# Patient Record
Sex: MALE | URBAN - METROPOLITAN AREA
[De-identification: names, ages, dates, MRNs, and addresses within clinical notes are randomized per-mention and may not be internally consistent; named-entity substitution may affect disease eponyms.]

---

## 2019-01-28 ENCOUNTER — APPOINTMENT (RX ONLY)
Dept: URBAN - METROPOLITAN AREA CLINIC 112 | Facility: CLINIC | Age: 49
Setting detail: DERMATOLOGY
End: 2019-01-28

## 2019-01-28 DIAGNOSIS — L40.0 PSORIASIS VULGARIS: ICD-10-CM

## 2019-01-28 PROBLEM — L85.3 XEROSIS CUTIS: Status: ACTIVE | Noted: 2019-01-28

## 2019-01-28 PROBLEM — L29.8 OTHER PRURITUS: Status: ACTIVE | Noted: 2019-01-28

## 2019-01-28 PROCEDURE — 96900 ACTINOTHERAPY UV LIGHT: CPT

## 2019-01-28 PROCEDURE — ? PHOTOTHERAPY TREATMENT

## 2019-01-28 PROCEDURE — ? SEPARATE AND IDENTIFIABLE DOCUMENTATION

## 2019-01-28 PROCEDURE — ? COUNSELING

## 2019-01-28 PROCEDURE — ? TREATMENT REGIMEN

## 2019-01-28 PROCEDURE — ? PRESCRIPTION

## 2019-01-28 PROCEDURE — 99203 OFFICE O/P NEW LOW 30 MIN: CPT | Mod: 25

## 2019-01-28 RX ORDER — FLUOCINONIDE 0.5 MG/ML
SOLUTION TOPICAL
Qty: 1 | Refills: 2 | Status: ERX | COMMUNITY
Start: 2019-01-28

## 2019-01-28 RX ORDER — FLUOCINONIDE 0.5 MG/G
CREAM TOPICAL
Qty: 1 | Refills: 1 | Status: ERX | COMMUNITY
Start: 2019-01-28

## 2019-01-28 RX ORDER — HYDROCORTISONE 25 MG/G
CREAM TOPICAL
Qty: 1 | Refills: 2 | Status: ERX | COMMUNITY
Start: 2019-01-28

## 2019-01-28 RX ORDER — KETOCONAZOLE 20.5 MG/ML
SHAMPOO, SUSPENSION TOPICAL
Qty: 1 | Refills: 5 | Status: ERX | COMMUNITY
Start: 2019-01-28

## 2019-01-28 RX ORDER — KETOCONAZOLE 20 MG/G
CREAM TOPICAL
Qty: 1 | Refills: 2 | Status: ERX | COMMUNITY
Start: 2019-01-28

## 2019-01-28 RX ADMIN — FLUOCINONIDE: 0.5 CREAM TOPICAL at 18:27

## 2019-01-28 RX ADMIN — KETOCONAZOLE: 20.5 SHAMPOO, SUSPENSION TOPICAL at 18:22

## 2019-01-28 RX ADMIN — HYDROCORTISONE: 25 CREAM TOPICAL at 18:29

## 2019-01-28 RX ADMIN — KETOCONAZOLE: 20 CREAM TOPICAL at 18:30

## 2019-01-28 RX ADMIN — FLUOCINONIDE: 0.5 SOLUTION TOPICAL at 18:26

## 2019-01-28 ASSESSMENT — PGA PSORIASIS: PGA PSORIASIS: MODERATE (MODERATE PLAQUE ELEVATION, MODERATE ERYTHEMA, COARSE SCALE PREDOMINATES)

## 2019-01-28 ASSESSMENT — BSA PSORIASIS: % BODY COVERED IN PSORIASIS: 4

## 2019-01-28 NOTE — PROCEDURE: PHOTOTHERAPY TREATMENT
Comments: Increase 20% until 2000mj/cm2, then 10% increases as tolerated. Per lucy Siddiqui for patient to not shield groin during phototherapy sessions. Recommended full body phototherapy once weekly in combination with twice weekly excimer treatments on scalp.
Protocol For Photochemotherapy: Mineral Oil And Nbuvb: The patient received Photochemotherapy: Mineral Oil and NBUVB (mineral oil applied to all lesions prior to phototherapy).
Protocol For Uva: The patient received UVA.
Render Post-Care In The Note: no
Protocol For Nb Uva: The patient received NB UVA.
Total Body Time: 0:50 minutes
Protocol For Photochemotherapy: Petrolatum And Broad Band Uvb: The patient received Photochemotherapy: Petrolatum and Broad Band UVB.
Protocol For Photochemotherapy For Severe Photoresponsive Dermatoses: Puva: The patient received Photochemotherapy for severe photoresponsive dermatoses: PUVA requiring at least 4 to 8 hours of care under direct physician supervision.
Protocol For Photochemotherapy: Triamcinolone Ointment And Nbuvb: The patient received Photochemotherapy: Triamcinolone and NBUVB (triamcinolone ointment applied to all lesions prior to phototherapy).
Post-Care Instructions: I reviewed with the patient in detail post-care instructions. Patient is to wear sun protection. Patients may expect sunburn like redness, discomfort and scabbing.
Protocol For Uva1: The patient received UVA1.
Protocol For Photochemotherapy For Severe Photoresponsive Dermatoses: Tar And Broad Band Uvb (Goeckerman Treatment): The patient received Photochemotherapy for severe photoresponsive dermatoses: Tar and Broad Band UVB (Goeckerman treatment) requiring at least 4 to 8 hours of care under direct physician supervision.
Protocol: NBUVB
Protocol For Photochemotherapy: Mineral Oil And Broad Band Uvb: The patient received Photochemotherapy: Mineral Oil and Broad Band UVB.
Protocol For Bath Puva: The patient received Bath PUVA.
Detail Level: Zone
Protocol For Nbuvb: The patient received NBUVB.
Protocol For Broad Band Uvb: The patient received Broad Band UVB.
Protocol For Photochemotherapy For Severe Photoresponsive Dermatoses: Petrolatum And Broad Band Uvb: The patient received Photochemotherapyfor severe photoresponsive dermatoses: Petrolatum and Broad Band UVB requiring at least 4 to 8 hours of care under direct physician supervision.
Treatment Number: 1
Protocol For Photochemotherapy: Tar And Nbuvb (Goeckerman Treatment): The patient received Photochemotherapy: Tar and NBUVB (Goeckerman treatment).
Protocol For Protocol For Photochemotherapy For Severe Photoresponsive Dermatoses: Bath Puva: The patient received Photochemotherapy for severe photoresponsive dermatoses: Bath PUVA requiring at least 4 to 8 hours of care under direct physician supervision.
Protocol For Photochemotherapy For Severe Photoresponsive Dermatoses: Tar And Nbuvb (Goeckerman Treatment): The patient received Photochemotherapy for severe photoresponsive dermatoses: Tar and NBUVB (Goeckerman treatment) requiring at least 4 to 8 hours of care under direct physician supervision.
Skin Type: II
Protocol For Photochemotherapy: Baby Oil And Nbuvb: The patient received Photochemotherapy: Baby Oil and NBUVB (baby oil applied to all lesions prior to phototherapy).
Protocol For Photochemotherapy: Petrolatum And Nbuvb: The patient received Photochemotherapy: Petrolatum and NBUVB (petrolatum applied to all lesions prior to phototherapy).
Protocol For Photochemotherapy For Severe Photoresponsive Dermatoses: Petrolatum And Nbuvb: The patient received Photochemotherapy for severe photoresponsive dermatoses: Petrolatum and NBUVB requiring at least 4 to 8 hours of care under direct physician supervision.
Consent: Written consent obtained.  The risks were reviewed with the patient including but not limited to: burn, pigmentary changes, pain, blistering, scabbing, redness, increased risk of skin cancers, and the remote possibility of scarring.
Protocol For Photochemotherapy: Tar And Broad Band Uvb (Goeckerman Treatment): The patient received Photochemotherapy: Tar and Broad Band UVB (Goeckerman treatment).
Total Body Energy: 120 mj/cm2
Protocol For Puva: The patient received PUVA.

## 2019-01-28 NOTE — HPI: RASH (PSORIASIS)
Do You Have A Family History Of Psoriasis?: no
How Severe Is Your Psoriasis?: moderate
Is This A New Presentation, Or A Follow-Up?: Psoriasis
Additional History: Patient was diagnosed with psoriatic arthritic in 2008. Previously patient had nail putting and swollen joints in hands. Patient was on Enbrel fro [id number] and saw significant improvement. Patient stopped Enbrel due to concerns with immunosuppression. Patient started diet with cutting out processed food and dairy. Patient unsure of improvement with diet change. Other prior treatments include unknown topical steroids. Current treatment: OTC antifungal cream BIW.

## 2019-01-28 NOTE — PROCEDURE: TREATMENT REGIMEN
Action 1: Continue
Other Instructions: Patient to contact clinic of joint pain worsens. Will consider Otezla at next office visit depending on improvement with current treatment regimen.
Start Regimen: -Ketoconazole 2% shampoo on scalp, face, groin daily until improved, then 2-3 times weekly for maintenance. Patient to lather and leave 5 minutes prior to rinsing. \\n\\n-Fluocinonide 0.05 solution on scalp twice daily, 2 weeks on,1 week off, repeat prn flares, avoid on face/groin/ axillae \\n\\n-Fluocinonide 0.05% cream on legs and trunk twice daily, 2 weeks on,1 week off, repeat prn flares, avoid on face/groin/axillae \\n\\n-Patient to begin applying 1:1 mixture of ketoconazole 2% cream and hydrocortisone 2.5% cream on face and groin twice daily, repeat prn flares. \\n\\n-Patient to begin NBUVB phototherapy (full body) once weekly. Skin type II. Phototherapy PowerPoint and consent completed today.\\n\\n-Patient to begin excimer treatment on scalp BIW.
Detail Level: Zone

## 2019-01-30 ENCOUNTER — APPOINTMENT (RX ONLY)
Dept: URBAN - METROPOLITAN AREA CLINIC 112 | Facility: CLINIC | Age: 49
Setting detail: DERMATOLOGY
End: 2019-01-30

## 2019-01-30 DIAGNOSIS — L40.0 PSORIASIS VULGARIS: ICD-10-CM

## 2019-01-30 PROCEDURE — ? EXCIMER LASER

## 2019-01-30 PROCEDURE — 96921 EXCIMER LSR PSRIASIS 250-500: CPT

## 2019-01-30 ASSESSMENT — LOCATION ZONE DERM: LOCATION ZONE: SCALP

## 2019-01-30 ASSESSMENT — LOCATION SIMPLE DESCRIPTION DERM: LOCATION SIMPLE: SCALP

## 2019-01-30 ASSESSMENT — LOCATION DETAILED DESCRIPTION DERM: LOCATION DETAILED: LEFT SUPERIOR PARIETAL SCALP

## 2019-01-30 NOTE — PROCEDURE: EXCIMER LASER
Comments: Excimer laser initiation slide show reviewed.
Post-Care Instructions: I reviewed with the patient in detail post-care instructions. Patient should stay away from the sun and wear sun protection until treated areas are fully healed.
Total Square Area In Cm2 (Required For Proper Billing- Whole Numbers Only Please): 272
Mode: repeat paint
Fluence #1 (J/Cm2 Or Mj/Cm2): 500
Spot Size: 3 x 3 cm
Consent: Written consent obtained, risks reviewed including but not limited to crusting, scabbing, blistering, scarring, darker or lighter pigmentary change, incidental hair removal, bruising, and/or incomplete removal.
Detail Level: Zone
Fluence Units: mJ/cm2
Treatment Number: 1
Location #1: entire scalp

## 2019-02-01 ENCOUNTER — APPOINTMENT (RX ONLY)
Dept: URBAN - METROPOLITAN AREA CLINIC 112 | Facility: CLINIC | Age: 49
Setting detail: DERMATOLOGY
End: 2019-02-01

## 2019-02-01 DIAGNOSIS — L40.0 PSORIASIS VULGARIS: ICD-10-CM

## 2019-02-01 PROCEDURE — ? EXCIMER LASER

## 2019-02-01 PROCEDURE — 96920 EXCIMER LSR PSRIASIS<250SQCM: CPT

## 2019-02-01 ASSESSMENT — LOCATION SIMPLE DESCRIPTION DERM: LOCATION SIMPLE: SCALP

## 2019-02-01 ASSESSMENT — LOCATION DETAILED DESCRIPTION DERM: LOCATION DETAILED: LEFT SUPERIOR PARIETAL SCALP

## 2019-02-01 ASSESSMENT — LOCATION ZONE DERM: LOCATION ZONE: SCALP

## 2019-02-01 NOTE — PROCEDURE: EXCIMER LASER
Consent: Written consent obtained, risks reviewed including but not limited to crusting, scabbing, blistering, scarring, darker or lighter pigmentary change, incidental hair removal, bruising, and/or incomplete removal.
Location #1: entire scalp
Fluence Units: mJ/cm2
Post-Care Instructions: I reviewed with the patient in detail post-care instructions. Patient should stay away from the sun and wear sun protection until treated areas are fully healed.
Total Square Area In Cm2 (Required For Proper Billing- Whole Numbers Only Please): 240
Mode: repeat paint
Spot Size: 3 x 3 cm
Fluence #1 (J/Cm2 Or Mj/Cm2): 630
Detail Level: Zone
Treatment Number: 2

## 2019-02-01 NOTE — HPI: EXCIMER LASER PHOTOTHERAPY
Is This A New Presentation, Or A Follow-Up?: Excimer Laser Follow-up
When Was Your Last Excimer Laser Treatment?: 1/30

## 2019-02-04 ENCOUNTER — APPOINTMENT (RX ONLY)
Dept: URBAN - METROPOLITAN AREA CLINIC 112 | Facility: CLINIC | Age: 49
Setting detail: DERMATOLOGY
End: 2019-02-04

## 2019-02-04 DIAGNOSIS — L40.0 PSORIASIS VULGARIS: ICD-10-CM

## 2019-02-04 PROCEDURE — 96920 EXCIMER LSR PSRIASIS<250SQCM: CPT

## 2019-02-04 PROCEDURE — ? EXCIMER LASER

## 2019-02-04 ASSESSMENT — LOCATION DETAILED DESCRIPTION DERM: LOCATION DETAILED: LEFT SUPERIOR PARIETAL SCALP

## 2019-02-04 ASSESSMENT — LOCATION SIMPLE DESCRIPTION DERM: LOCATION SIMPLE: SCALP

## 2019-02-04 ASSESSMENT — LOCATION ZONE DERM: LOCATION ZONE: SCALP

## 2019-02-04 NOTE — PROCEDURE: EXCIMER LASER
Spot Size: 3 x 3 cm
Detail Level: Zone
Post-Care Instructions: I reviewed with the patient in detail post-care instructions. Patient should stay away from the sun and wear sun protection until treated areas are fully healed.
Total Square Area In Cm2 (Required For Proper Billing- Whole Numbers Only Please): 161
Fluence Units: mJ/cm2
Consent: Written consent obtained, risks reviewed including but not limited to crusting, scabbing, blistering, scarring, darker or lighter pigmentary change, incidental hair removal, bruising, and/or incomplete removal.
Location #1: entire scalp
Mode: repeat paint
Treatment Number: 3
Fluence #1 (J/Cm2 Or Mj/Cm2): 795

## 2019-02-04 NOTE — HPI: EXCIMER LASER PHOTOTHERAPY
Is This A New Presentation, Or A Follow-Up?: Excimer Laser Treatment
When Was Your Last Excimer Laser Treatment?: 2/1/2019

## 2019-02-08 ENCOUNTER — APPOINTMENT (RX ONLY)
Dept: URBAN - METROPOLITAN AREA CLINIC 112 | Facility: CLINIC | Age: 49
Setting detail: DERMATOLOGY
End: 2019-02-08

## 2019-02-08 DIAGNOSIS — L40.0 PSORIASIS VULGARIS: ICD-10-CM

## 2019-02-08 PROCEDURE — ? EXCIMER LASER

## 2019-02-08 PROCEDURE — 96920 EXCIMER LSR PSRIASIS<250SQCM: CPT

## 2019-02-08 ASSESSMENT — LOCATION SIMPLE DESCRIPTION DERM: LOCATION SIMPLE: SCALP

## 2019-02-08 ASSESSMENT — LOCATION ZONE DERM: LOCATION ZONE: SCALP

## 2019-02-08 ASSESSMENT — LOCATION DETAILED DESCRIPTION DERM: LOCATION DETAILED: LEFT SUPERIOR PARIETAL SCALP

## 2019-02-08 NOTE — PROCEDURE: EXCIMER LASER
Spot Size: 3 x 3 cm
Treatment Number: 4
Detail Level: Zone
Comments: Significant improvement appreciated. Only 2-3 few very small plaques noted with diffuse background erythema and mild scale.  Patient agrees and is pleased with results.
Total Square Area In Cm2 (Required For Proper Billing- Whole Numbers Only Please): 178
Post-Care Instructions: I reviewed with the patient in detail post-care instructions. Patient should stay away from the sun and wear sun protection until treated areas are fully healed.
Mode: repeat paint
Location #1: entire scalp
Fluence #1 (J/Cm2 Or Mj/Cm2): 990
Fluence Units: mJ/cm2
Consent: Written consent obtained, risks reviewed including but not limited to crusting, scabbing, blistering, scarring, darker or lighter pigmentary change, incidental hair removal, bruising, and/or incomplete removal.

## 2019-02-11 ENCOUNTER — APPOINTMENT (RX ONLY)
Dept: URBAN - METROPOLITAN AREA CLINIC 112 | Facility: CLINIC | Age: 49
Setting detail: DERMATOLOGY
End: 2019-02-11

## 2019-02-11 DIAGNOSIS — L40.0 PSORIASIS VULGARIS: ICD-10-CM

## 2019-02-11 PROCEDURE — 96920 EXCIMER LSR PSRIASIS<250SQCM: CPT

## 2019-02-11 PROCEDURE — ? EXCIMER LASER

## 2019-02-11 ASSESSMENT — LOCATION DETAILED DESCRIPTION DERM: LOCATION DETAILED: LEFT SUPERIOR PARIETAL SCALP

## 2019-02-11 ASSESSMENT — LOCATION SIMPLE DESCRIPTION DERM: LOCATION SIMPLE: SCALP

## 2019-02-11 ASSESSMENT — LOCATION ZONE DERM: LOCATION ZONE: SCALP

## 2019-02-11 NOTE — PROCEDURE: EXCIMER LASER
Treatment Number: 5
Consent: Written consent obtained, risks reviewed including but not limited to crusting, scabbing, blistering, scarring, darker or lighter pigmentary change, incidental hair removal, bruising, and/or incomplete removal.
Fluence Units: mJ/cm2
Location #1: entire scalp
Mode: repeat paint
Total Square Area In Cm2 (Required For Proper Billing- Whole Numbers Only Please): 188
Post-Care Instructions: I reviewed with the patient in detail post-care instructions. Patient should stay away from the sun and wear sun protection until treated areas are fully healed.
Comments: Significant improvement appreciated. Only 2-3 few very small plaques noted with diffuse background erythema and mild scale.  Patient agrees and is pleased with results.
Fluence #1 (J/Cm2 Or Mj/Cm2): 9318
Spot Size: 3 x 3 cm
Detail Level: Zone

## 2019-02-19 ENCOUNTER — APPOINTMENT (RX ONLY)
Dept: URBAN - METROPOLITAN AREA CLINIC 112 | Facility: CLINIC | Age: 49
Setting detail: DERMATOLOGY
End: 2019-02-19

## 2019-02-19 DIAGNOSIS — L40.0 PSORIASIS VULGARIS: ICD-10-CM

## 2019-02-19 PROCEDURE — ? EXCIMER LASER

## 2019-02-19 PROCEDURE — 96920 EXCIMER LSR PSRIASIS<250SQCM: CPT

## 2019-02-19 ASSESSMENT — LOCATION DETAILED DESCRIPTION DERM: LOCATION DETAILED: LEFT SUPERIOR PARIETAL SCALP

## 2019-02-19 ASSESSMENT — LOCATION ZONE DERM: LOCATION ZONE: SCALP

## 2019-02-19 ASSESSMENT — LOCATION SIMPLE DESCRIPTION DERM: LOCATION SIMPLE: SCALP

## 2019-02-19 NOTE — PROCEDURE: EXCIMER LASER
Detail Level: Zone
Mode: repeat paint
Consent: Written consent obtained, risks reviewed including but not limited to crusting, scabbing, blistering, scarring, darker or lighter pigmentary change, incidental hair removal, bruising, and/or incomplete removal.
Fluence #1 (J/Cm2 Or Mj/Cm2): 3769
Fluence Units: mJ/cm2
Location #1: entire scalp
Total Square Area In Cm2 (Required For Proper Billing- Whole Numbers Only Please): 201
Spot Size: 3 x 3 cm
Comments: No symptoms s/p last treatment. Increased (though still mild) erythema with minimal scale noted on the scalp throughout. Patient reports he  frequency of topical steroid use to 2-3x weekly as the sig. on his prescription refill had changed. Advised patient to continue topical steroids as directed in last office visit: twice daily applications.
Treatment Number: 5
Post-Care Instructions: I reviewed with the patient in detail post-care instructions. Patient should stay away from the sun and wear sun protection until treated areas are fully healed.

## 2019-03-01 ENCOUNTER — APPOINTMENT (RX ONLY)
Dept: URBAN - METROPOLITAN AREA CLINIC 112 | Facility: CLINIC | Age: 49
Setting detail: DERMATOLOGY
End: 2019-03-01

## 2019-03-01 DIAGNOSIS — L40.0 PSORIASIS VULGARIS: ICD-10-CM

## 2019-03-01 PROCEDURE — ? EXCIMER LASER

## 2019-03-01 PROCEDURE — 96920 EXCIMER LSR PSRIASIS<250SQCM: CPT

## 2019-03-01 ASSESSMENT — LOCATION ZONE DERM: LOCATION ZONE: SCALP

## 2019-03-01 ASSESSMENT — LOCATION DETAILED DESCRIPTION DERM: LOCATION DETAILED: LEFT SUPERIOR PARIETAL SCALP

## 2019-03-01 ASSESSMENT — LOCATION SIMPLE DESCRIPTION DERM: LOCATION SIMPLE: SCALP

## 2019-03-01 NOTE — PROCEDURE: EXCIMER LASER
Detail Level: Zone
Treatment Number: 6
Location #1: entire scalp
Fluence #1 (J/Cm2 Or Mj/Cm2): 1590
Consent: Written consent obtained, risks reviewed including but not limited to crusting, scabbing, blistering, scarring, darker or lighter pigmentary change, incidental hair removal, bruising, and/or incomplete removal.
Fluence Units: mJ/cm2
Total Square Area In Cm2 (Required For Proper Billing- Whole Numbers Only Please): 201
Mode: repeat paint
Comments: Dosage held today due to 13 day lapse in treatment. Patient continues to treat topically.
Post-Care Instructions: I reviewed with the patient in detail post-care instructions. Patient should stay away from the sun and wear sun protection until treated areas are fully healed.
Spot Size: 3 x 3 cm

## 2019-03-08 ENCOUNTER — APPOINTMENT (RX ONLY)
Dept: URBAN - METROPOLITAN AREA CLINIC 112 | Facility: CLINIC | Age: 49
Setting detail: DERMATOLOGY
End: 2019-03-08

## 2019-03-08 DIAGNOSIS — L40.0 PSORIASIS VULGARIS: ICD-10-CM

## 2019-03-08 PROCEDURE — 96920 EXCIMER LSR PSRIASIS<250SQCM: CPT

## 2019-03-08 PROCEDURE — ? EXCIMER LASER

## 2019-03-08 ASSESSMENT — LOCATION SIMPLE DESCRIPTION DERM: LOCATION SIMPLE: SCALP

## 2019-03-08 ASSESSMENT — LOCATION DETAILED DESCRIPTION DERM: LOCATION DETAILED: LEFT SUPERIOR PARIETAL SCALP

## 2019-03-08 ASSESSMENT — LOCATION ZONE DERM: LOCATION ZONE: SCALP

## 2019-03-08 NOTE — PROCEDURE: EXCIMER LASER
Total Square Area In Cm2 (Required For Proper Billing- Whole Numbers Only Please): 184
Treatment Number: 8
Consent: Written consent obtained, risks reviewed including but not limited to crusting, scabbing, blistering, scarring, darker or lighter pigmentary change, incidental hair removal, bruising, and/or incomplete removal.
Comments: Dosage held today due to 13 day lapse in treatment. Patient continues to treat topically.
Spot Size: 3 x 3 cm
Location #1: entire scalp
Fluence Units: mJ/cm2
Mode: repeat paint
Post-Care Instructions: I reviewed with the patient in detail post-care instructions. Patient should stay away from the sun and wear sun protection until treated areas are fully healed.
Detail Level: Zone
Fluence #1 (J/Cm2 Or Mj/Cm2): 9890

## 2019-03-11 ENCOUNTER — APPOINTMENT (RX ONLY)
Dept: URBAN - METROPOLITAN AREA CLINIC 112 | Facility: CLINIC | Age: 49
Setting detail: DERMATOLOGY
End: 2019-03-11

## 2019-03-11 DIAGNOSIS — L40.0 PSORIASIS VULGARIS: ICD-10-CM

## 2019-03-11 PROCEDURE — 96920 EXCIMER LSR PSRIASIS<250SQCM: CPT

## 2019-03-11 PROCEDURE — ? EXCIMER LASER

## 2019-03-11 ASSESSMENT — LOCATION DETAILED DESCRIPTION DERM: LOCATION DETAILED: LEFT SUPERIOR PARIETAL SCALP

## 2019-03-11 ASSESSMENT — LOCATION ZONE DERM: LOCATION ZONE: SCALP

## 2019-03-11 ASSESSMENT — LOCATION SIMPLE DESCRIPTION DERM: LOCATION SIMPLE: SCALP

## 2019-03-11 NOTE — PROCEDURE: EXCIMER LASER
Fluence Units: mJ/cm2
Treatment Number: 9
Post-Care Instructions: I reviewed with the patient in detail post-care instructions. Patient should stay away from the sun and wear sun protection until treated areas are fully healed.
Comments: Dosage held today due to sunburn lasting 24hrs. Treated only plaque areas today.  Patient continues to treat topically.
Location #1: parietal and occipital scalp
Total Square Area In Cm2 (Required For Proper Billing- Whole Numbers Only Please): 30
Mode: repeat paint
Consent: Written consent obtained, risks reviewed including but not limited to crusting, scabbing, blistering, scarring, darker or lighter pigmentary change, incidental hair removal, bruising, and/or incomplete removal.
Spot Size: 3 x 3 cm
Fluence #1 (J/Cm2 Or Mj/Cm2): 5340
Detail Level: Zone

## 2019-04-01 ENCOUNTER — APPOINTMENT (RX ONLY)
Dept: URBAN - METROPOLITAN AREA CLINIC 112 | Facility: CLINIC | Age: 49
Setting detail: DERMATOLOGY
End: 2019-04-01

## 2019-04-01 DIAGNOSIS — L40.0 PSORIASIS VULGARIS: ICD-10-CM | Status: IMPROVED

## 2019-04-01 PROCEDURE — 99213 OFFICE O/P EST LOW 20 MIN: CPT

## 2019-04-01 PROCEDURE — ? TREATMENT REGIMEN

## 2019-04-01 PROCEDURE — ? COUNSELING

## 2019-04-01 NOTE — PROCEDURE: TREATMENT REGIMEN
Increase Regimen: Patient to continue excimer treatment on scalp BIW and begin excimer on persistent psoriatic lesions on lower leg BIW
Action 3: Continue
Other Instructions: Excellent improvement with topical therapy and Excimer laser.  Continue PRN. \\n\\nPatient to contact clinic of joint pain recurs. Discussed consideration of Otezla if psoriatic arthritis becomes problematic again.
Start Regimen: -Ketoconazole 2% shampoo on scalp, face, groin daily until improved, then 2-3 times weekly for maintenance. Patient to lather and leave 5 minutes prior to rinsing. \\n\\n-Fluocinonide 0.05 solution on scalp twice daily, 2 weeks on,1 week off, repeat prn flares, avoid on face/groin/ axillae \\n\\n-Fluocinonide 0.05% cream on legs and trunk twice daily, 2 weeks on,1 week off, repeat prn flares, avoid on face/groin/axillae \\n\\n-Patient to begin applying 1:1 mixture of ketoconazole 2% cream and hydrocortisone 2.5% cream on face and groin twice daily, repeat prn flares.\\n\\nFor topical steroids, if 2 weeks on/1 week off intervals inhibit improvement, patient can switch to 5 days on/2 days off.
Detail Level: Zone

## 2019-04-04 ENCOUNTER — APPOINTMENT (RX ONLY)
Dept: URBAN - METROPOLITAN AREA CLINIC 112 | Facility: CLINIC | Age: 49
Setting detail: DERMATOLOGY
End: 2019-04-04

## 2019-04-04 DIAGNOSIS — L40.0 PSORIASIS VULGARIS: ICD-10-CM

## 2019-04-04 PROCEDURE — ? EXCIMER LASER

## 2019-04-04 PROCEDURE — 96920 EXCIMER LSR PSRIASIS<250SQCM: CPT

## 2019-04-04 ASSESSMENT — LOCATION ZONE DERM: LOCATION ZONE: SCALP

## 2019-04-04 ASSESSMENT — LOCATION SIMPLE DESCRIPTION DERM: LOCATION SIMPLE: SCALP

## 2019-04-04 ASSESSMENT — LOCATION DETAILED DESCRIPTION DERM: LOCATION DETAILED: LEFT SUPERIOR PARIETAL SCALP

## 2019-04-04 NOTE — PROCEDURE: EXCIMER LASER
Detail Level: Zone
Treatment Number: 10
Total Square Area In Cm2 (Required For Proper Billing- Whole Numbers Only Please): 140
Fluence Units: mJ/cm2
Comments: Treated at starting dose of 500 mJ due to 3+ week lapse in treatment. Treated all erythematous areas on the scalp and started treatment on plaque on the left anterior leg per last OV
Location #1: scalp, left shin x1 full size pulse
Post-Care Instructions: I reviewed with the patient in detail post-care instructions. Patient should stay away from the sun and wear sun protection until treated areas are fully healed.
Consent: Written consent obtained, risks reviewed including but not limited to crusting, scabbing, blistering, scarring, darker or lighter pigmentary change, incidental hair removal, bruising, and/or incomplete removal.
Spot Size: 2 x 2 cm
Fluence #1 (J/Cm2 Or Mj/Cm2): 500
Mode: repeat paint

## 2019-04-11 ENCOUNTER — APPOINTMENT (RX ONLY)
Dept: URBAN - METROPOLITAN AREA CLINIC 112 | Facility: CLINIC | Age: 49
Setting detail: DERMATOLOGY
End: 2019-04-11

## 2019-04-11 DIAGNOSIS — L40.0 PSORIASIS VULGARIS: ICD-10-CM

## 2019-04-11 PROCEDURE — 96920 EXCIMER LSR PSRIASIS<250SQCM: CPT

## 2019-04-11 PROCEDURE — ? EXCIMER LASER

## 2019-04-11 ASSESSMENT — LOCATION ZONE DERM: LOCATION ZONE: SCALP

## 2019-04-11 ASSESSMENT — LOCATION DETAILED DESCRIPTION DERM: LOCATION DETAILED: LEFT SUPERIOR PARIETAL SCALP

## 2019-04-11 ASSESSMENT — LOCATION SIMPLE DESCRIPTION DERM: LOCATION SIMPLE: SCALP

## 2019-04-11 NOTE — PROCEDURE: EXCIMER LASER
Treatment Number: 11
Comments: No symptoms after last treatment. Treated all erythematous patches on the scalp and lower leg. Increased 25% per protocol.
Location #1: scalp, left shin x1 full size pulse
Fluence Units: mJ/cm2
Total Square Area In Cm2 (Required For Proper Billing- Whole Numbers Only Please): 105
Detail Level: Zone
Post-Care Instructions: I reviewed with the patient in detail post-care instructions. Patient should stay away from the sun and wear sun protection until treated areas are fully healed.
Consent: Written consent obtained, risks reviewed including but not limited to crusting, scabbing, blistering, scarring, darker or lighter pigmentary change, incidental hair removal, bruising, and/or incomplete removal.
Mode: repeat paint
Fluence #1 (J/Cm2 Or Mj/Cm2): 625
Spot Size: 2 x 2 cm

## 2019-04-29 ENCOUNTER — APPOINTMENT (RX ONLY)
Dept: URBAN - METROPOLITAN AREA CLINIC 112 | Facility: CLINIC | Age: 49
Setting detail: DERMATOLOGY
End: 2019-04-29

## 2019-04-29 DIAGNOSIS — L40.0 PSORIASIS VULGARIS: ICD-10-CM

## 2019-04-29 PROCEDURE — 96920 EXCIMER LSR PSRIASIS<250SQCM: CPT

## 2019-04-29 PROCEDURE — ? EXCIMER LASER

## 2019-04-29 ASSESSMENT — LOCATION DETAILED DESCRIPTION DERM: LOCATION DETAILED: LEFT SUPERIOR PARIETAL SCALP

## 2019-04-29 ASSESSMENT — LOCATION ZONE DERM: LOCATION ZONE: SCALP

## 2019-04-29 ASSESSMENT — LOCATION SIMPLE DESCRIPTION DERM: LOCATION SIMPLE: SCALP

## 2019-04-29 NOTE — PROCEDURE: EXCIMER LASER
Spot Size: 2 x 2 cm
Post-Care Instructions: I reviewed with the patient in detail post-care instructions. Patient should stay away from the sun and wear sun protection until treated areas are fully healed.
Total Square Area In Cm2 (Required For Proper Billing- Whole Numbers Only Please): 56
Location #1: scalp, left shin x1 full size pulse
Treatment Number: 12
Consent: Written consent obtained, risks reviewed including but not limited to crusting, scabbing, blistering, scarring, darker or lighter pigmentary change, incidental hair removal, bruising, and/or incomplete removal.
Comments: No symptoms after last treatment. Treated all erythematous patches on the scalp and lower leg. Decreased 25% per protocol due to lapse.
Mode: repeat paint
Detail Level: Zone
Fluence Units: mJ/cm2
Fluence #1 (J/Cm2 Or Mj/Cm2): 480

## 2019-10-28 ENCOUNTER — APPOINTMENT (RX ONLY)
Dept: URBAN - METROPOLITAN AREA CLINIC 112 | Facility: CLINIC | Age: 49
Setting detail: DERMATOLOGY
End: 2019-10-28

## 2019-10-28 DIAGNOSIS — L40.0 PSORIASIS VULGARIS: ICD-10-CM

## 2019-10-28 PROCEDURE — ? INTRALESIONAL KENALOG

## 2019-10-28 PROCEDURE — ? PRESCRIPTION

## 2019-10-28 PROCEDURE — ? COUNSELING

## 2019-10-28 PROCEDURE — 99213 OFFICE O/P EST LOW 20 MIN: CPT | Mod: 25

## 2019-10-28 PROCEDURE — ? TREATMENT REGIMEN

## 2019-10-28 PROCEDURE — ? SEPARATE AND IDENTIFIABLE DOCUMENTATION

## 2019-10-28 PROCEDURE — 11900 INJECT SKIN LESIONS </W 7: CPT

## 2019-10-28 RX ORDER — FLUOCINONIDE 0.5 MG/ML
SOLUTION TOPICAL
Qty: 1 | Refills: 2 | Status: ERX

## 2019-10-28 ASSESSMENT — LOCATION DETAILED DESCRIPTION DERM
LOCATION DETAILED: RIGHT CENTRAL PARIETAL SCALP
LOCATION DETAILED: LEFT DISTAL POSTERIOR THIGH
LOCATION DETAILED: LEFT INFERIOR OCCIPITAL SCALP
LOCATION DETAILED: LEFT DISTAL PRETIBIAL REGION

## 2019-10-28 ASSESSMENT — LOCATION ZONE DERM
LOCATION ZONE: LEG
LOCATION ZONE: SCALP

## 2019-10-28 ASSESSMENT — LOCATION SIMPLE DESCRIPTION DERM
LOCATION SIMPLE: SCALP
LOCATION SIMPLE: LEFT POSTERIOR THIGH
LOCATION SIMPLE: LEFT PRETIBIAL REGION

## 2019-10-28 NOTE — PROCEDURE: TREATMENT REGIMEN
Continue Regimen: - Ketoconazole 2% shampoo on scalp, face, groin daily until improved, then 2-3 times weekly for maintenance. Patient to lather and leave 5 minutes prior to rinsing. \\n- Fluocinonide 0.05% solution on scalp twice daily, 2 weeks on,1 week off, repeat prn flares, avoid on face/groin/ axillae \\n- Fluocinonide 0.05% cream on legs and trunk twice daily, 2 weeks on,1 week off, repeat prn flares, avoid on face/groin/axillae \\n- Patient to begin applying 1:1 mixture of ketoconazole 2% cream and hydrocortisone 2.5% cream on face and groin twice daily, repeat prn flares.\\n\\nFor topical steroids, if 2 weeks on/1 week off intervals inhibit improvement, patient can switch to 5 days on/2 days off.
Action 3: Continue
Other Instructions: Patient is generally pleased with topical therapy at this time. He elects to treat persistent lesions on the scalp and left leg with ILK today. Follow up in 1 month for repeat ILK, otherwise follow up in 6 months.  RTC PRN for Excimer restart.
Detail Level: Zone
Discontinue Regimen: - Excimer laser

## 2019-10-28 NOTE — PROCEDURE: INTRALESIONAL KENALOG
Expiration Date (Optional): JAN 2021
X Size Of Lesion In Cm (Optional): 0
Lot # (Optional): [id number]
Consent: The risks of atrophy were reviewed with the patient.
Include Z78.9 (Other Specified Conditions Influencing Health Status) As An Associated Diagnosis?: No
Medical Necessity Clause: This procedure was medically necessary because the lesions that were treated were:
Total Volume Injected (Ccs- Only Use Numbers And Decimals): 0.8
Detail Level: Simple
Concentration Of Solution Injected (Mg/Ml): 5.0
Kenalog Preparation: Kenalog
Administered By (Optional): Tameka Land PA-C

## 2020-04-29 ENCOUNTER — APPOINTMENT (RX ONLY)
Dept: URBAN - METROPOLITAN AREA CLINIC 111 | Facility: CLINIC | Age: 50
Setting detail: DERMATOLOGY
End: 2020-04-29

## 2020-04-29 DIAGNOSIS — L40.0 PSORIASIS VULGARIS: ICD-10-CM | Status: INADEQUATELY CONTROLLED

## 2020-04-29 PROCEDURE — ? COUNSELING

## 2020-04-29 PROCEDURE — 99213 OFFICE O/P EST LOW 20 MIN: CPT | Mod: 95

## 2020-04-29 PROCEDURE — ? TREATMENT REGIMEN

## 2020-04-29 PROCEDURE — ? PRESCRIPTION

## 2020-04-29 RX ORDER — HALOBETASOL PROPIONATE AND TAZAROTENE .1; .45 MG/G; MG/G
LOTION TOPICAL
Qty: 1 | Refills: 2 | Status: ERX | COMMUNITY
Start: 2020-04-29

## 2020-04-29 RX ORDER — HYDROCORTISONE 25 MG/G
CREAM TOPICAL
Qty: 1 | Refills: 2 | Status: ERX

## 2020-04-29 RX ADMIN — HALOBETASOL PROPIONATE AND TAZAROTENE: .1; .45 LOTION TOPICAL at 00:00

## 2020-04-29 ASSESSMENT — LOCATION ZONE DERM: LOCATION ZONE: LEG

## 2020-04-29 ASSESSMENT — LOCATION DETAILED DESCRIPTION DERM
LOCATION DETAILED: LEFT PROXIMAL PRETIBIAL REGION
LOCATION DETAILED: LEFT DISTAL POSTERIOR THIGH

## 2020-04-29 ASSESSMENT — LOCATION SIMPLE DESCRIPTION DERM
LOCATION SIMPLE: LEFT POSTERIOR THIGH
LOCATION SIMPLE: LEFT PRETIBIAL REGION

## 2020-04-29 NOTE — PROCEDURE: TREATMENT REGIMEN
Action 1: Continue
Discontinue Regimen: - Excimer laser\\n- Fluocinonide 0.05% cream (not very effective)
Start Regimen: - Duobrii lotion: Apply to affected areas of legs QD for 3-4 weeks, then take a 1 week break. Repeat prn flares. (Guardian, contact info given to patient today. If cost prohibitive, will send clobetasol ointment instead)
Detail Level: Zone
Other Instructions: Discussed treatment options: switching topical medications, biologics, Otezla, NBUVB. \\n\\nPatient elects to trial changing his topical medications. He will call clinic if he would like to begin Otezla. \\n\\nFollow up in 2-3 months
Continue Regimen: - Ketoconazole 2% shampoo on scalp, face, groin daily until improved, then 2-3 times weekly for maintenance. Patient to lather and leave 5 minutes prior to rinsing. \\n- Fluocinonide 0.05% solution on scalp twice daily, 2 weeks on,1 week off, repeat prn flares, avoid on face/groin/ axillae \\n- Patient to begin applying 1:1 mixture of ketoconazole 2% cream and hydrocortisone 2.5% cream on face and groin twice daily, repeat prn flares.\\n\\nFor topical steroids, if 2 weeks on/1 week off intervals inhibit improvement, patient can switch to 5 days on/2 days off.

## 2020-12-07 ENCOUNTER — RX ONLY (OUTPATIENT)
Age: 50
Setting detail: RX ONLY
End: 2020-12-07

## 2020-12-07 RX ORDER — FLUOCINONIDE 0.5 MG/ML
SOLUTION TOPICAL
Qty: 1 | Refills: 2 | Status: ERX

## 2020-12-07 RX ORDER — HYDROCORTISONE 25 MG/G
CREAM TOPICAL
Qty: 1 | Refills: 2 | Status: ERX

## 2020-12-07 RX ORDER — FLUOCINONIDE 0.5 MG/G
CREAM TOPICAL
Qty: 1 | Refills: 2 | Status: ERX

## 2021-02-05 ENCOUNTER — RX ONLY (OUTPATIENT)
Age: 51
Setting detail: RX ONLY
End: 2021-02-05

## 2021-02-05 RX ORDER — KETOCONAZOLE 20 MG/ML
SHAMPOO, SUSPENSION TOPICAL
Qty: 1 | Refills: 1 | Status: ERX

## 2021-02-05 RX ORDER — KETOCONAZOLE 20 MG/G
CREAM TOPICAL
Qty: 1 | Refills: 2 | Status: CANCELLED